# Patient Record
Sex: MALE | Race: WHITE
[De-identification: names, ages, dates, MRNs, and addresses within clinical notes are randomized per-mention and may not be internally consistent; named-entity substitution may affect disease eponyms.]

---

## 2018-06-15 ENCOUNTER — HOSPITAL ENCOUNTER (OUTPATIENT)
Dept: HOSPITAL 80 - CIMAGING | Age: 43
End: 2018-06-15
Attending: FAMILY MEDICINE
Payer: COMMERCIAL

## 2018-06-15 DIAGNOSIS — J34.1: Primary | ICD-10-CM

## 2018-06-15 DIAGNOSIS — R93.0: ICD-10-CM

## 2018-07-13 ENCOUNTER — HOSPITAL ENCOUNTER (OUTPATIENT)
Dept: HOSPITAL 80 - FIMAGING | Age: 43
End: 2018-07-13
Attending: FAMILY MEDICINE
Payer: COMMERCIAL

## 2018-07-13 DIAGNOSIS — I63.9: Primary | ICD-10-CM

## 2018-07-13 DIAGNOSIS — J34.1: ICD-10-CM

## 2018-07-13 PROCEDURE — A9585 GADOBUTROL INJECTION: HCPCS

## 2018-07-30 ENCOUNTER — HOSPITAL ENCOUNTER (OUTPATIENT)
Dept: HOSPITAL 80 - FIMAGING | Age: 43
End: 2018-07-30
Attending: FAMILY MEDICINE
Payer: COMMERCIAL

## 2018-07-30 DIAGNOSIS — I69.30: Primary | ICD-10-CM

## 2018-12-12 ENCOUNTER — HOSPITAL ENCOUNTER (EMERGENCY)
Dept: HOSPITAL 80 - CED | Age: 43
LOS: 1 days | Discharge: HOME | End: 2018-12-13
Payer: COMMERCIAL

## 2018-12-12 DIAGNOSIS — I10: ICD-10-CM

## 2018-12-12 DIAGNOSIS — R07.2: Primary | ICD-10-CM

## 2018-12-12 LAB — PLATELET # BLD: 308 10^3/UL (ref 150–400)

## 2018-12-12 NOTE — EDPHY
H & P


Smoking Status: Never smoked


Time Seen by Provider: 12/12/18 21:40


HPI/ROS: 





CHIEF COMPLAINT:  Chest pain





HISTORY OF PRESENT ILLNESS:  Patient presents with chest pain.  He states that 

about 4 hr ago, around 530 this evening, he noticed some mild chest pain in the 

left substernal region.  He describes it as a pressure-type pain with some 

radiation to the left neck.  Over the course of 4 hr pain got somewhat worse 

which prompted his visit to the emergency department.  He states that the worst 

it was for 5/10.  He states he has had no significant shortness of breath, 

nausea, vomiting or diaphoresis related to this pain.  He has had no new 

dyspnea on exertion but admits that he is out of shape and gets tired easily.  

He denies leg swelling, recent travel, recent illnesses.  He states he has been 

a little stuffy today and kind of achy.  He also has a heart rate monitor that 

he wears and states that his heart rate has been over 100 most of the day 

today.  He has hypertension but no prior history of coronary artery disease.  

He was seen by Dr. Chauncey Decker approximately 6 months ago and had a exercise 

stress test, carotid ultrasound, echo done which were all essentially normal.  

This workup was initiated as he was noted to have tachycardia and also lacunar 

infarcts on a CT scan and MRI that were done for a persistent sinus infection.





REVIEW OF SYSTEMS:


Constitutional:  No fever, no chills.


Eyes:  No discharge.


ENT:  No sore throat.


Cardiovascular:  Per HPI


Respiratory:  No cough, no shortness of breath.


Gastrointestinal:  No abdominal pain, no vomiting.


Genitourinary:  No dysuria.


Musculoskeletal:  No back pain.


Skin:  No rashes.


Neurological:  No headache.  





General Appearance:  Alert, no distress.  Obese.


Eyes:  Pupils equal and round no pallor or injection.


ENT, Mouth:  Mucous membranes moist.


Respiratory:  There are no retractions, lungs are clear to auscultation.


Cardiovascular:  Tachycardic, no murmurs rubs or gallops.  Normal femoral 

pulses.  Palpation does not reproduce symptoms.


Gastrointestinal:  Abdomen is soft and nontender, no masses, bowel sounds 

normal.


Neurological:  Awake, alert, cranial nerves normal.  No focal deficits.


Skin:  Warm and dry, no rashes.


Musculoskeletal:  Neck is supple nontender.


Extremities are symmetrical, full range of motion, no edema.


Psychiatric:  Patient is oriented X 3, there is no agitation.





Medical/surgical history:  Hypertension, eczema, obstructive sleep apnea with 

CPAP at night.


Social history:  Nonsmoker, family history includes maternal grandfather who 

had heart attack in his 70s.  Mother with tachycardia and hypertension. (Kaia Villatoro)


Constitutional: 


 Initial Vital Signs











Temperature (C)  36.6 C   12/12/18 21:27


 


Heart Rate  100   12/12/18 21:27


 


Respiratory Rate  20   12/12/18 21:27


 


Blood Pressure  173/115 H  12/12/18 21:27


 


O2 Sat (%)  97   12/12/18 21:27








 











O2 Delivery Mode               Room Air


 


O2 (L/minute)                  2














Allergies/Adverse Reactions: 


 





No Known Allergies Allergy (Unverified 12/12/18 21:34)


 








Home Medications: 














 Medication  Instructions  Recorded


 


Amlodipine Besylate  12/12/18


 


Hydrochlorothiazide  12/12/18


 


Metoprolol Succinate [Toprol Xl] 50 mg PO DAILY #14 tab.er.24h 12/13/18














Medical Decision Making





- Diagnostics


Imaging: I viewed and interpreted images myself





- Diagnostics


EKG Interpretation: 





EKG performed for chest pain.  EKG shows sinus tachycardia with a rate of 103. 

Normal intervals.  Left axis.  Q-waves present in 3 and AVF.  Nonspecific T-

wave flattening inferior and lateral leads.  No acute ST T-wave changes to 

suggest infarct or ischemia.  Impression nonspecific EKG.   (Kaia Villatoro)





EKG 2.  


EKG:


Interpreted by me contemporaneously.  


Rhythm:  Normal sinus rhythm.


Heart rate  90 


QTc 512


QRS:  Narrow Q's in 3 and F


STT segment:  Nonspecific ST-T changes in 1 V4 V5 V6    


T Waves: Normal  


Q waves narrow Q-waves in 3 and F


Summary:    Nonspecific ST-T changes and borderline significant Q-waves in 3 

and F (Michael Prajapati)


Imaging Results: 


 Imaging Impressions





Chest X-Ray  12/12/18 21:41


Impression: Mild bronchitis. No other findings for acute cardiopulmonary 

abnormality.








Chest x-ray shows no infiltrates, pneumothorax.  Normal heart size.  Generous 

mediastinum.  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 (Kaia Villatoro)


ED Course/Re-evaluation: 


9:55 p.m. patient lightheaded, pale, lower blood pressure after nitro.  Laid 

flat.  Pain 0/10.  Initial troponin 0.01





10:25 p.m. all test back, negative troponin, negative D-dimer.  CBC pending 

will need to be sent to [facility].  Had lengthy discussion with patient 

regarding heart score, risk factors, evaluation in the emergency department.  

With shared decision making patient and wife choose to remain in the emergency 

department for several hours to perform repeat EKG and troponin.  His heart 

score is 3 which puts him in the low risk category.  He gets points for 

suspicious story and risk factors.  He remains chest pain-free.





11:00 p.m. discussed patient's presentation, history, workup with Dr. Prajapati.  

Plan is for repeat EKG and troponin at midnight. And discharge home if remain 

negative.  Obviously if things change Dr. Prajapati will re-evaluate and consider 

admission.  Patient does have both primary care physician and a cardiologist 

for follow-up. (Kaia Villatoro)


Differential Diagnosis: 





Differential diagnosis includes but is not limited to myocardial infarction, 

acute coronary syndrome, pulmonary embolism, congestive heart failure, 

bronchitis, viral syndrome.  After initial evaluation in the emergency 

department no evidence of ischemia or infarct, pulmonary embolism, vascular 

abnormality or arrhythmia other than sinus tachycardia.  Patient's blood 

pressure was quite high on arrival although he tells me his systolics have been 

in the 150s to 160s normally at home.  Doubt hypertensive emergency.  On my 

departure from the emergency department plan is for"rule out"with close follow-

up with Cardiology as outpatient. (Kaia Villatoro)


Other Provider: 


Care assumed at change of shift.


D/w off going physician.  Chart reviewed.  Pt interviewed and examined.  

Troponin and repeat EKG pending at midnight.





During the initial course of his 1st hour and a half here in the department his 

symptoms disappeared after having a brief hypotensive response to 

nitroglycerin.  He had been given aspirin 325. The initial troponin which was a 

approximately a 4 hr troponin was negative at 0.01.  His heart score is 3 

pending the 2nd troponin. 





As he left work after a stressful day whereby he was aware of his heart beating 

fast, as he would could see the results on his apple I watch, he noted a 

pressure feeling in the chest that began well as walking outside.  It persisted 

until he arrived here and finally went away after sublingual nitroglycerin.  

There is no associated symptoms such as shortness of breath, pleuritic pain, or 

diaphoresis or nausea vomiting.  Of note however is thus the very 1st time he 

has ever experienced chest discomfort such as this.  Granted, he has noted a 

poor exercise tolerance in the past with being short of breath with a single 

flight of stairs but this has been ongoing for months and is no worse and does 

not make him stop the middle of that flight of stairs.





RF:


He has no risk factors for DVT PE.  


No risk factors for aortic dissection.





Risk factors for coronary disease include obesity and hypertension which is 

poorly controlled.  He does not have a family history of early-onset coronary 

disease nor does he smoke nor does he have diabetes or high cholesterol.





Exam as per Dr. Villatoro.





EKG 1. Interpreted Dr. Villatoro and I concur with a sinus tachycardia a mild 

nonspecific ST-T changes anterolaterally.





Repeat EKG at midnight shows these changes again however heart rate is now 90.





Initial troponin is 0.01 with subsequent 1 as a 6 hr level at 0.00





In view of his apparent anxiety while at work and is persistent hypertension 

with a diastolic of 105 and the potential for this being a coronary achy all GI 

will place him on Toprol XL50 mg daily for the next 2 weeks while he is getting 

his workup.  Evidently had some trouble with hydrochlorothiazide due to the 

diuretic effects while initially taking that medication this past spring and 

perhaps a beta-blocker would be a better selection given his predilection for 

stress at work.





I again reviewed with the patient the heart score and his result being 

approximately 2% of risk of Mace in the next 30 days.  I discussed with him the 

prospect of coming in for further evaluation and diagnostic testing in the 

hospital versus an outpatient treatment and he opted for the latter.  


 (Michael Prajapati)





- Data Points


Laboratory Results: 


 Laboratory Results





 12/12/18 21:41 





 











  12/13/18 12/12/18 12/12/18





  00:08 21:46 21:46


 


WBC      





    


 


RBC      





    


 


Hgb      





    


 


Hct      





    


 


MCV      





    


 


MCH      





    


 


MCHC      





    


 


RDW      





    


 


Plt Count      





    


 


MPV      





    


 


Neut % (Auto)      





    


 


Lymph % (Auto)      





    


 


Mono % (Auto)      





    


 


Eos % (Auto)      





    


 


Baso % (Auto)      





    


 


Nucleat RBC Rel Count      





    


 


Absolute Neuts (auto)      





    


 


Absolute Lymphs (auto)      





    


 


Absolute Monos (auto)      





    


 


Absolute Eos (auto)      





    


 


Absolute Basos (auto)      





    


 


Absolute Nucleated RBC      





    


 


Immature Gran %      





    


 


Immature Gran #      





    


 


RBC/WBC/PLT Morphology      





    


 


Platelet Estimate      





    


 


POC Sodium    137 mEq/L mEq/L  





    (135-145)  


 


POC Potassium    3.3 mEq/L mEq/L  





    (3.3-5.0)  


 


POC Chloride    96.0 mEq/L L mEq/L  





    ()  


 


POC Total CO2    26 mEq/L mEq/L  





    (22-31)  


 


POC BUN    10 mg/dL mg/dL  





    (7-23)  


 


POC Creatinine    0.7 mg/dL mg/dL  





    (0.7-1.3)  


 


POC Glucose    119 mg/dL H mg/dL  





    ()  


 


POC Calcium    9.7 mg/dL mg/dL  





    (8.5-10.4)  


 


POC Total Bilirubin    0.5 mg/dL mg/dL  





    (0.1-1.4)  


 


POC AST    43 IU/L IU/L  





    (17-59)  


 


POC ALT    55 IU/L IU/L  





    (21-72)  


 


POC Alk Phosphatase    111 IU/L IU/L  





    ()  


 


POC Troponin I  0.00 ng/mL ng/mL    0.01 ng/mL ng/mL





   (0.00-0.08)    (0.00-0.08) 


 


POC Total Protein    7.7 g/dL g/dL  





    (6.3-8.2)  


 


POC Albumin    4.0 g/dL g/dL  





    (3.5-5.0)  














  12/12/18





  21:41


 


WBC  13.73 10^3/uL H 10^3/uL





   (3.80-9.50) 


 


RBC  5.66 10^6/uL 10^6/uL





   (4.40-6.38) 


 


Hgb  18.1 g/dL H g/dL





   (13.7-17.5) 


 


Hct  49.6 % %





   (40.0-51.0) 


 


MCV  87.6 fL fL





   (81.5-99.8) 


 


MCH  32.0 pg pg





   (27.9-34.1) 


 


MCHC  36.5 g/dL g/dL





   (32.4-36.7) 


 


RDW  12.8 % %





   (11.5-15.2) 


 


Plt Count  308 10^3/uL 10^3/uL





   (150-400) 


 


MPV  10.3 fL fL





   (8.7-11.7) 


 


Neut % (Auto)  61.0 % %





   (39.3-74.2) 


 


Lymph % (Auto)  23.7 % %





   (15.0-45.0) 


 


Mono % (Auto)  11.9 % %





   (4.5-13.0) 


 


Eos % (Auto)  2.0 % %





   (0.6-7.6) 


 


Baso % (Auto)  0.9 % %





   (0.3-1.7) 


 


Nucleat RBC Rel Count  0.0 % %





   (0.0-0.2) 


 


Absolute Neuts (auto)  8.38 10^3/uL H 10^3/uL





   (1.70-6.50) 


 


Absolute Lymphs (auto)  3.25 10^3/uL H 10^3/uL





   (1.00-3.00) 


 


Absolute Monos (auto)  1.63 10^3/uL H 10^3/uL





   (0.30-0.80) 


 


Absolute Eos (auto)  0.27 10^3/uL 10^3/uL





   (0.03-0.40) 


 


Absolute Basos (auto)  0.12 10^3/uL H 10^3/uL





   (0.02-0.10) 


 


Absolute Nucleated RBC  0.00 10^3/uL 10^3/uL





   (0-0.01) 


 


Immature Gran %  0.5 % %





   (0.0-1.1) 


 


Immature Gran #  0.07 10^3/uL 10^3/uL





   (0.00-0.10) 


 


RBC/WBC/PLT Morphology  TNP 





  


 


Platelet Estimate  TNP 





  


 


POC Sodium  





  


 


POC Potassium  





  


 


POC Chloride  





  


 


POC Total CO2  





  


 


POC BUN  





  


 


POC Creatinine  





  


 


POC Glucose  





  


 


POC Calcium  





  


 


POC Total Bilirubin  





  


 


POC AST  





  


 


POC ALT  





  


 


POC Alk Phosphatase  





  


 


POC Troponin I  





  


 


POC Total Protein  





  


 


POC Albumin  





  











Medications Given: 


 





Nitroglycerin (Nitrostat)  0.4 mg SL Q5M PRN


   PRN Reason: Chest Pain


   Last Admin: 12/12/18 21:58 Dose:  0.4 tab.er





Discontinued Medications





Aspirin (Aspirin)  324 mg PO EDNOW ONE


   Stop: 12/12/18 21:41


   Last Admin: 12/12/18 21:57 Dose:  324 mg





Point of Care Test Results: 


 Chemistry











  12/13/18 12/12/18 12/12/18





  00:08 21:46 21:46


 


POC Sodium    137 mEq/L mEq/L  





    (135-145)  


 


POC Potassium    3.3 mEq/L mEq/L  





    (3.3-5.0)  


 


POC Chloride    96.0 mEq/L L mEq/L  





    ()  


 


POC Total CO2    26 mEq/L mEq/L  





    (22-31)  


 


POC BUN    10 mg/dL mg/dL  





    (7-23)  


 


POC Creatinine    0.7 mg/dL mg/dL  





    (0.7-1.3)  


 


POC Glucose    119 mg/dL H mg/dL  





    ()  


 


POC Calcium    9.7 mg/dL mg/dL  





    (8.5-10.4)  


 


POC Total Bilirubin    0.5 mg/dL mg/dL  





    (0.1-1.4)  


 


POC AST    43 IU/L IU/L  





    (17-59)  


 


POC ALT    55 IU/L IU/L  





    (21-72)  


 


POC Alk Phosphatase    111 IU/L IU/L  





    ()  


 


POC Troponin I  0.00 ng/mL ng/mL    0.01 ng/mL ng/mL





   (0.00-0.08)    (0.00-0.08) 


 


POC Total Protein    7.7 g/dL g/dL  





    (6.3-8.2)  


 


POC Albumin    4.0 g/dL g/dL  





    (3.5-5.0)  








 D-Dimer











D-Dimer Collection Date        12/12/18


 


D-Dimer Collection Time        21:41


 


D-Dimer (ng/ml)                LESS THAN 100











 Influenza PCR











Flu Nasal Swab Collection Date 12/13/18


 


Flu Nasal Swab Collection Time 20:30


 


Influenza A Result             Not Detected


 


Influenza B Result             Not Detected

















Departure





- Departure


Disposition: Home, Routine, Self-Care


Clinical Impression: 


Chest pain


Qualifiers:


 Chest pain type: precordial pain Qualified Code(s): R07.2 - Precordial pain





Condition: Good


Instructions:  Chest Pain (ED)


Additional Instructions: 


Call tomorrow to be seen in the next 1-2 days for follow-up with her 

cardiologist


Going forward take OTC aspirin 81 mg daily


For now, add Toprol XL 50 mg daily


Consider discussing with her family physician a beta-blocker for long-term 

treatment of her high blood pressure


Return to any ER immediately via 911 if he have recurrence of the chest pain


For now, you're not to engage in any exercise until you are released by your 

cardiologist.  This includes sexual intercourse.





Referrals: 


Patient,NotPresent [Primary Care Provider] - As per Instructions


Prescriptions: 


Metoprolol Succinate [Toprol Xl] 50 mg PO DAILY #14 tab.er.24h

## 2018-12-13 VITALS — DIASTOLIC BLOOD PRESSURE: 100 MMHG | SYSTOLIC BLOOD PRESSURE: 157 MMHG

## 2018-12-19 NOTE — CPEKG
Test Reason : OPEN

Blood Pressure : ***/*** mmHG

Vent. Rate : 103 BPM     Atrial Rate : 103 BPM

   P-R Int : 171 ms          QRS Dur : 091 ms

    QT Int : 363 ms       P-R-T Axes : -08 015 -29 degrees

   QTc Int : 475 ms

 

Sinus tachycardia

Borderline T abnormalities, inferior leads

Borderline prolonged QT interval

 

Confirmed by Kaia Villatoro ([id number]) on 12/19/2018 8:43:40 AM

 

Referred By:             Confirmed By:Kaia Villatoro

## 2018-12-20 NOTE — CPEKG
Test Reason : OPEN

Blood Pressure : ***/*** mmHG

Vent. Rate : 090 BPM     Atrial Rate : 090 BPM

   P-R Int : 164 ms          QRS Dur : 093 ms

    QT Int : 447 ms       P-R-T Axes : -18 -11 003 degrees

   QTc Int : 547 ms

 

Sinus rhythm

Left ventricular hypertrophy

Inferior infarct, old

Prolonged QT interval

 

Confirmed by Marquis Puente ([id number]) on 12/20/2018 8:54:52 AM

 

Referred By:             Confirmed By:Marquis Puente